# Patient Record
Sex: FEMALE | Race: BLACK OR AFRICAN AMERICAN | URBAN - METROPOLITAN AREA
[De-identification: names, ages, dates, MRNs, and addresses within clinical notes are randomized per-mention and may not be internally consistent; named-entity substitution may affect disease eponyms.]

---

## 2021-03-10 ENCOUNTER — OFFICE VISIT (OUTPATIENT)
Dept: OBGYN CLINIC | Age: 51
End: 2021-03-10
Payer: COMMERCIAL

## 2021-03-10 VITALS
OXYGEN SATURATION: 98 % | BODY MASS INDEX: 40.02 KG/M2 | TEMPERATURE: 97.9 F | SYSTOLIC BLOOD PRESSURE: 175 MMHG | HEART RATE: 68 BPM | WEIGHT: 212 LBS | HEIGHT: 61 IN | DIASTOLIC BLOOD PRESSURE: 91 MMHG

## 2021-03-10 DIAGNOSIS — Z12.31 ENCOUNTER FOR SCREENING MAMMOGRAM FOR MALIGNANT NEOPLASM OF BREAST: ICD-10-CM

## 2021-03-10 DIAGNOSIS — I10 ESSENTIAL HYPERTENSION: ICD-10-CM

## 2021-03-10 DIAGNOSIS — Z12.11 SCREENING FOR COLON CANCER: ICD-10-CM

## 2021-03-10 DIAGNOSIS — Z01.419 ENCOUNTER FOR GYNECOLOGICAL EXAMINATION WITHOUT ABNORMAL FINDING: Primary | ICD-10-CM

## 2021-03-10 DIAGNOSIS — Z12.4 SCREENING FOR CERVICAL CANCER: ICD-10-CM

## 2021-03-10 PROBLEM — I15.9 SECONDARY HYPERTENSION: Status: ACTIVE | Noted: 2021-03-10

## 2021-03-10 PROBLEM — R56.9 SEIZURES (HCC): Status: ACTIVE | Noted: 2021-03-10

## 2021-03-10 PROBLEM — I15.9 SECONDARY HYPERTENSION: Status: RESOLVED | Noted: 2021-03-10 | Resolved: 2021-03-10

## 2021-03-10 PROCEDURE — 99386 PREV VISIT NEW AGE 40-64: CPT | Performed by: OBSTETRICS & GYNECOLOGY

## 2021-03-10 RX ORDER — LEVETIRACETAM 500 MG/1
TABLET ORAL
COMMUNITY
Start: 2021-03-09 | End: 2022-02-23 | Stop reason: ALTCHOICE

## 2021-03-10 RX ORDER — LISINOPRIL AND HYDROCHLOROTHIAZIDE 20; 25 MG/1; MG/1
TABLET ORAL
COMMUNITY
Start: 2021-03-10 | End: 2022-02-23 | Stop reason: ALTCHOICE

## 2021-03-10 NOTE — PATIENT INSTRUCTIONS
Can you let him know to make a follow up apt. xrays show multiple retained markers.    Preventing Osteoporosis: Care Instructions Your Care Instructions Osteoporosis means the bones are weak and thin enough that they can break easily. The older you are, the more likely you are to get osteoporosis. But with plenty of calcium, vitamin D, and exercise, you can help prevent osteoporosis. The preteen and teen years are a key time for bone building. With the help of calcium, vitamin D, and exercise in those early years and beyond, the bones reach their peak density and strength by age 27. After age 27, your bones naturally start to thin and weaken. The stronger your bones are at around age 27, the lower your risk for osteoporosis. But no matter what your age and risk are, your bones still need calcium, vitamin D, and exercise to stay strong. Also avoid smoking, and limit alcohol. Smoking and heavy alcohol use can make your bones thinner. Talk to your doctor about any special risks you might have, such as having a close relative with osteoporosis or taking a medicine that can weaken bones. Your doctor can tell you the best ways to protect your bones from thinning. Follow-up care is a key part of your treatment and safety. Be sure to make and go to all appointments, and call your doctor if you are having problems. It's also a good idea to know your test results and keep a list of the medicines you take. How can you care for yourself at home? · Get enough calcium and vitamin D. The [place] [employer] recommends adults younger than age 46 need 1,000 mg of calcium and 600 IU of vitamin D each day. Women ages 46 to 79 need 1,200 mg of calcium and 600 IU of vitamin D each day. Men ages 46 to 79 need 1,000 mg of calcium and 600 IU of vitamin D each day. Adults 71 and older need 1,200 mg of calcium and 800 IU of vitamin D each day. ? Eat foods rich in calcium, like yogurt, cheese, milk, and dark green vegetables. ? Eat foods rich in vitamin D, like eggs, fatty fish, cereal, and fortified milk. ? Get some sunshine. Your body uses sunshine to make its own vitamin D. The safest time to be out in the sun is before 10 a.m. or after 3 p.m. Avoid getting sunburned. Sunburn can increase your risk of skin cancer. ? Talk to your doctor about taking a calcium plus vitamin D supplement if you don't think you are getting enough through your diet and sunshine. Ask about what type of calcium is right for you, and how much to take at a time. Adults ages 23 to 48 should not get more than 2,500 mg of calcium and 4,000 IU of vitamin D each day, whether it is from supplements and/or food. Adults ages 46 and older should not get more than 2,000 mg of calcium and 4,000 IU of vitamin D each day from supplements and/or food. · Get regular bone-building exercise. Weight-bearing and resistance exercises keep bones healthy by working the muscles and bones against gravity. Start out at an exercise level that feels right for you. Add a little at a time until you can do the following: ? Do 30 minutes of weight-bearing exercise on most days of the week. Walking, jogging, stair climbing, and dancing are good choices. ? Do resistance exercises with weights or elastic bands 2 to 3 days a week. · Limit alcohol. Drink no more than 1 alcohol drink a day if you are a woman. Drink no more than 2 alcohol drinks a day if you are a man. · Do not smoke. Smoking can make bones thin faster. If you need help quitting, talk to your doctor about stop-smoking programs and medicines. These can increase your chances of quitting for good. When should you call for help? Watch closely for changes in your health, and be sure to contact your doctor if you have any problems. Where can you learn more? Go to http://www.gray.com/ Enter I898 in the search box to learn more about \"Preventing Osteoporosis: Care Instructions. \" Current as of: April 15, 2020               Content Version: 12.6 © 3869-8523 [employer]. Care instructions adapted under license by [employer] (which disclaims liability or warranty for this information). If you have questions about a medical condition or this instruction, always ask your healthcare professional. [address] any warranty or liability for your use of this information. Elevated Blood Pressure: Care Instructions Your Care Instructions Blood pressure is a measure of how hard the blood pushes against the walls of your arteries. It's normal for blood pressure to go up and down throughout the day. But if it stays up over time, you have high blood pressure. Two numbers tell you your blood pressure. The first number is the systolic pressure. It shows how hard the blood pushes when your heart is pumping. The second number is the diastolic pressure. It shows how hard the blood pushes between heartbeats, when your heart is relaxed and filling with blood. An ideal blood pressure in adults is less than 120/80 (say \"120 over 80\"). High blood pressure is 140/90 or higher. You have high blood pressure if your top number is 140 or higher or your bottom number is 90 or higher, or both. The main test for high blood pressure is simple, fast, and painless. To diagnose high blood pressure, your doctor will test your blood pressure at different times. After testing your blood pressure, your doctor may ask you to test it again when you are home. If you are diagnosed with high blood pressure, you can work with your doctor to make a long-term plan to manage it. Follow-up care is a key part of your treatment and safety. Be sure to make and go to all appointments, and call your doctor if you are having problems. It's also a good idea to know your test results and keep a list of the medicines you take. How can you care for yourself at home? · Do not smoke. Smoking increases your risk for heart attack and stroke.  If you need help quitting, talk to your doctor about stop-smoking programs and medicines. These can increase your chances of quitting for good. · Stay at a healthy weight. · Try to limit how much sodium you eat to less than 2,300 milligrams (mg) a day. Your doctor may ask you to try to eat less than 1,500 mg a day. · Be physically active. Get at least 30 minutes of exercise on most days of the week. Walking is a good choice. You also may want to do other activities, such as running, swimming, cycling, or playing tennis or team sports. · Avoid or limit alcohol. Talk to your doctor about whether you can drink any alcohol. · Eat plenty of fruits, vegetables, and low-fat dairy products. Eat less saturated and total fats. · Learn how to check your blood pressure at home. When should you call for help? Call your doctor now or seek immediate medical care if: 
? · Your blood pressure is much higher than normal (such as 180/110 or higher). ? · You think high blood pressure is causing symptoms such as: ¨ Severe headache. ¨ Blurry vision. ? Watch closely for changes in your health, and be sure to contact your doctor if: 
? · You do not get better as expected. Where can you learn more? Go to http://www.gray.com/. Enter F414 in the search box to learn more about \"Elevated Blood Pressure: Care Instructions. \" Current as of: September 21, 2016 Content Version: 11.4 © 2574-7596 [employer]. Care instructions adapted under license by [employer] (which disclaims liability or warranty for this information). If you have questions about a medical condition or this instruction, always ask your healthcare professional. [address] any warranty or liability for your use of this information. When You Are Overweight: Care Instructions Your Care Instructions If you're overweight, your doctor may recommend that you make changes in your eating and exercise habits.  Being overweight can lead to serious health problems, such as high blood pressure, heart disease, type 2 diabetes, and arthritis, or it can make these problems worse. Eating a healthy diet and being more active can help you reach and stay at a healthy weight. You don't have to make huge changes all at once. Start by making small changes in your eating and exercise habits. To lose weight, you need to burn more calories than you take in. You can do this by eating healthy foods in reasonable amounts and becoming more active every day. Follow-up care is a key part of your treatment and safety. Be sure to make and go to all appointments, and call your doctor if you are having problems. It's also a good idea to know your test results and keep a list of the medicines you take. How can you care for yourself at home? · Improve your eating habits. You'll be more successful if you work on changing one eating habit at a time. All foods, if eaten in moderation, can be part of healthy eating. Remember to: 
? Eat a variety of foods from each food group. Include grains, vegetables, fruits, dairy, and protein foods. ? Limit foods high in fat, sugar, and calories. ? Eat slowly. And don't do anything else, such as watch TV, while you are eating. ? Pay attention to portion sizes. Put your food on a smaller plate. ? Plan your meals ahead of time. You'll be less likely to grab something that's not as healthy. · Get active. Regular activity can help you feel better, have more energy, and burn more calories. If you haven't been active, start slowly. Start with at least 30 minutes of moderate activity on most days of the week. Then gradually increase the amount of activity. Try for 60 or 90 minutes a day, at least 5 days a week. There are a lot of ways to fit activity into your life. You can: 
? Walk or bike to the store. Or walk with a friend, or walk the dog. 
? Mow the lawn, rake leaves, shovel snow, or do some gardening. ?  Use the stairs instead of the elevator, at least for a few floors. · Change your thinking. Your thoughts have a lot to do with how you feel and what you do. When you're trying to reach a healthy weight, changing how you think about certain things may help. Here are some ideas: 
? Don't compare yourself to others. Healthy bodies come in all shapes and sizes. ? Pay attention to how hungry or full you feel. When you eat, be aware of why you're eating and how much you're eating. ? Focus on improving your health instead of dieting. Dieting almost never works over the long term. · Ask your doctor about other health professionals who can help you reach a healthy weight. ? A dietitian can help you make healthy changes in your diet. ? An exercise specialist or  can help you develop a safe and effective exercise program. 
? A counselor or psychiatrist can help you cope with issues such as depression, anxiety, or family problems that can make it hard to focus on reaching a healthy weight. · Get support from your family, your doctor, your friends, a support groupand support yourself. Where can you learn more? Go to http://www.gray.com/ Enter V442 in the search box to learn more about \"When You Are Overweight: Care Instructions. \" Current as of: December 11, 2019               Content Version: 12.6 © 1354-1179 [employer]. Care instructions adapted under license by [employer] (which disclaims liability or warranty for this information). If you have questions about a medical condition or this instruction, always ask your healthcare professional. [place] [address] any warranty or liability for your use of this information.

## 2021-03-10 NOTE — PROGRESS NOTES
HPI: Miky Smith is a 53 y.o. female, , LMP rare with Mirena IUD in place (placed 4 yrs ago), who presents today for the following:  Chief Complaint   Patient presents with    New Patient    Gyn Exam        She denies vaginal/vulvar pruritus; abnormal vaginal discharge or vaginal odor. Denies h/o STIs or abnormal pap smears. Last mammogram: never. Last colonoscopy: over 10 yrs ago. Past Medical History:   Diagnosis Date    Hypertension     Obesity     Seizures ([address]        Past Surgical History:   Procedure Laterality Date    HX  SECTION      HX SKIN BIOPSY      right hand lump removed       Family History   Problem Relation Age of Onset   Dossie Tamia Diabetes Mother     Breast Cancer Mother 68    Hypertension Father     Lung Cancer Father     Uterine Cancer Neg Hx     Ovarian Cancer Neg Hx     Colon Cancer Neg Hx        Social History     Socioeconomic History    Marital status: SINGLE     Spouse name: Not on file    Number of children: Not on file    Years of education: Not on file    Highest education level: Associate degree: academic program   Occupational History    Occupation:    Social Needs    Financial resource strain: Not on file    Food insecurity     Worry: Not on file     Inability: Not on file    Transportation needs     Medical: Not on file     Non-medical: Not on file   Tobacco Use    Smoking status: Never Smoker    Smokeless tobacco: Never Used   Substance and Sexual Activity    Alcohol use: Yes     Comment: Occasional    Drug use: Never    Sexual activity: Yes     Partners: Male     Birth control/protection: I.U.D.      Comment: Mirena IUD   Lifestyle    Physical activity     Days per week: Not on file     Minutes per session: Not on file    Stress: Not on file   Relationships    Social connections     Talks on phone: Not on file     Gets together: Not on file     Attends [employer] service: Not on file     Active member of club or organization: Not on file     Attends meetings of clubs or organizations: Not on file     Relationship status: Not on file    Intimate partner violence     Fear of current or ex partner: Not on file     Emotionally abused: Not on file     Physically abused: Not on file     Forced sexual activity: Not on file   Other Topics Concern    Not on file   Social History Narrative    Not on file             Review of Systems: Denies issues with eyes, ears, mouth, nose. Denies fevers/chills, significant weight loss/gain. Denies chest pain, shortness of breath, nausea, vomiting, constipation, diarrhea or abdominal pain. Denies dysuria. Denies muscle aches, weakness, numbness or tingling. Denies issues with breasts. Denies bleeding/clotting d/o's. Denies anxiety/depression, S/HI. OBJECTIVE:  BP (!) 175/91 (BP 1 Location: Right upper arm, BP Patient Position: Sitting, BP Cuff Size: Large adult)   Pulse 68   Temp 97.9 °F (36.6 °C) (Temporal)   Ht 5' 1\" (1.549 m)   Wt 212 lb (96.2 kg)   SpO2 98%   BMI 40.06 kg/m²      Constitutional  · Appearance: well-nourished, well developed, alert, in no acute distress    HENT  · Head and Face: appears normal    Neck  · Inspection/Palpation: normal appearance      Breasts   Symmetric, no palpable masses, no tenderness, no skin changes, no nipple abnormality, no nipple discharge, no axillary or supraclavicular lymphadenopathy.     Chest  · Respiratory Effort: normal      Gastrointestinal  · Abdominal Examination: abdomen non-tender to palpation, no masses present  · Liver and spleen: no hepatomegaly present, spleen not palpable      Genitourinary  · External Genitalia: normal appearance for age, no discharge present, no tenderness present, no inflammatory lesions present, no masses present, no atrophy present  · Vagina: normal vaginal vault without central or paravaginal defects, no discharge present, no inflammatory lesions present, no masses present  · Bladder: non-tender to palpation  · Urethra: appears normal  · Cervix: normal, no cervical motion tenderness or abnormal discharge; iud strings visible, pap smear obtained  · Uterus: normal size, shape and consistency  · Adnexa: no adnexal tenderness present, no adnexal masses present  · Perineum: perineum within normal limits, no evidence of trauma, no rashes or skin lesions present  · Anus: anus within normal limits, no hemorrhoids present    Skin  · General Inspection: no rash, no lesions identified    Neurologic/Psychiatric  · Mental Status:  · Orientation: grossly oriented to person, place and time  · Mood and Affect: mood normal, affect appropriate          Assessment/plan:    ICD-10-CM ICD-9-CM    1. Encounter for gynecological examination without abnormal finding  Z01.419 V72.31    2. Screening for cervical cancer  Z12.4 V76.2 PAP IG, APTIMA HPV AND RFX 16/18,45 ([id number])   3. Encounter for screening mammogram for malignant neoplasm of breast  Z12.31 V76.12 JOCY 3D ARVIND W MAMMO BI SCREENING INCL CAD   4. Screening for colon cancer  Z12.11 V76.51 REFERRAL TO GASTROENTEROLOGY   5. Essential hypertension  I10 401.9         -Annual gynecologic exam.    -Cervical cancer screening- pap smear and HPV co testing obtained today. -Breast cancer screening- breast awareness discussed; mammogram ordered. -STI screening-declines testing. .     -Colon cancer screening- colonoscopy referral placed. -Bone health-discussed weightbearing exercises, vitamin D and calcium supplementation.    -Elevated Bps in office. Reviewed contacting PCP in regards to medication management.

## 2021-03-16 LAB
CYTOLOGIST CVX/VAG CYTO: NORMAL
CYTOLOGY CVX/VAG DOC CYTO: NORMAL
CYTOLOGY CVX/VAG DOC THIN PREP: NORMAL
DX ICD CODE: NORMAL
HPV I/H RISK 4 DNA CVX QL PROBE+SIG AMP: NEGATIVE
Lab: NORMAL
OTHER STN SPEC: NORMAL
STAT OF ADQ CVX/VAG CYTO-IMP: NORMAL

## 2021-03-23 ENCOUNTER — TELEPHONE (OUTPATIENT)
Dept: OBGYN CLINIC | Age: 51
End: 2021-03-23

## 2021-03-23 NOTE — TELEPHONE ENCOUNTER
----- Message from Gene Holley MD sent at 3/18/2021 10:13 AM EDT -----  Please let her know that her Pap smear was negative.

## 2022-02-23 ENCOUNTER — OFFICE VISIT (OUTPATIENT)
Dept: OBGYN CLINIC | Age: 52
End: 2022-02-23
Payer: COMMERCIAL

## 2022-02-23 VITALS
HEIGHT: 61 IN | OXYGEN SATURATION: 98 % | DIASTOLIC BLOOD PRESSURE: 102 MMHG | WEIGHT: 218.25 LBS | TEMPERATURE: 98.1 F | HEART RATE: 66 BPM | SYSTOLIC BLOOD PRESSURE: 164 MMHG | BODY MASS INDEX: 41.21 KG/M2 | RESPIRATION RATE: 18 BRPM

## 2022-02-23 DIAGNOSIS — Z30.432 ENCOUNTER FOR IUD REMOVAL: Primary | ICD-10-CM

## 2022-02-23 PROCEDURE — 58301 REMOVE INTRAUTERINE DEVICE: CPT | Performed by: OBSTETRICS & GYNECOLOGY

## 2022-02-23 RX ORDER — BRIVARACETAM 100 MG/1
100 TABLET, FILM COATED ORAL 2 TIMES DAILY
COMMUNITY
Start: 2021-12-10

## 2022-02-23 RX ORDER — LOSARTAN POTASSIUM 100 MG/1
100 TABLET ORAL DAILY
COMMUNITY
Start: 2021-12-04

## 2022-02-23 NOTE — PROGRESS NOTES
Chief Complaint   Patient presents with    Contraception     States to have Mirena removed today. Is concerned that her protein levels are high and thinks this may be one of the reasons. 1. Have you been to the ER, urgent care clinic since your last visit? Hospitalized since your last visit? No    2. Have you seen or consulted any other health care providers outside of the [address] [place] [address] since your last visit? Include any pap smears or colon screening.  No

## 2022-02-23 NOTE — PROGRESS NOTES
Subjective:  Chief Complaints:       1. IUD Removal.    HPI:         Lidia Zacarias is a 49 y.o. female , amenorrheic with Mirena IUD, who presents today for IUD removal due to expiration. Placed in 2015 for [facility]. Current Outpatient Medications   Medication Sig    Briviact 100 mg tablet Take 100 mg by mouth two (2) times a day.  losartan (COZAAR) 100 mg tablet Take 100 mg by mouth daily. No current facility-administered medications for this visit. ROS:  RESPIRATORY:     Negative for shortness of breath, chest pain, chest congestion, cough. CARDIOLOGY:    Negative for dizziness, chest pain. GASTROENTEROLOGY:    Negative for nausea, heartburn, vomiting, abdominal pain, constipation. FEMALE REPRODUCTIVE:    Negative for abnormal vaginal discharge, abnormal vaginal bleeding. UROLOGY:    Negative for difficulty urinating, voiding dysfunction, frequent urination, dysuria. Objective:  Physical Examination:  GENERAL:     General: alert and oriented x 3, well developed  LUNGS:  nml resp effort. GENITOURINARY - FEMALE:      External genitalia: normal, no lesions. Urethra: normal external meatus. Vagina: unremarkable. Cervix/cuff: normal appearance, no lesions/abnormal discharge/bleeding, IUD strings visible. Pysch: normal mood    Assessment:  The encounter diagnosis was Encounter for IUD removal.       Procedures:  IUD Removal:  Informed consent obtained. Reviewed risks of procedure to include but not be limited to risk of bleeding, infection, pain 30 second time out taken. Prep: the patient was placed in the lithotomy position. Procedure: Speculum was placed into the vagina. The strings were easily visualized and grasped with ring forceps. IUD was removed with miminal traction. The IUD was shown to the patient. Post procedure: the patient tolerated procedure well. Warning signs for which to call the office reviewed with the patient.       Orders Placed This Encounter    Briviact 100 mg tablet     Sig: Take 100 mg by mouth two (2) times a day.  losartan (COZAAR) 100 mg tablet     Sig: Take 100 mg by mouth daily. Follow Up:   Annual when due